# Patient Record
Sex: FEMALE | Race: WHITE | Employment: FULL TIME | URBAN - METROPOLITAN AREA
[De-identification: names, ages, dates, MRNs, and addresses within clinical notes are randomized per-mention and may not be internally consistent; named-entity substitution may affect disease eponyms.]

---

## 2017-03-09 ENCOUNTER — HOSPITAL ENCOUNTER (EMERGENCY)
Facility: HOSPITAL | Age: 22
Discharge: HOME | End: 2017-03-10
Attending: EMERGENCY MEDICINE
Payer: COMMERCIAL

## 2017-03-09 DIAGNOSIS — K52.9 GASTROENTERITIS: Primary | ICD-10-CM

## 2017-03-09 PROCEDURE — 99283 EMERGENCY DEPT VISIT LOW MDM: CPT

## 2017-03-09 RX ORDER — ONDANSETRON 8 MG/1
8 TABLET, ORALLY DISINTEGRATING ORAL ONCE
Status: COMPLETED | OUTPATIENT
Start: 2017-03-09 | End: 2017-03-10

## 2017-03-10 VITALS
TEMPERATURE: 98 F | WEIGHT: 198 LBS | OXYGEN SATURATION: 98 % | SYSTOLIC BLOOD PRESSURE: 109 MMHG | HEIGHT: 62 IN | RESPIRATION RATE: 18 BRPM | BODY MASS INDEX: 36.44 KG/M2 | DIASTOLIC BLOOD PRESSURE: 53 MMHG | HEART RATE: 81 BPM

## 2017-03-10 RX ORDER — ONDANSETRON 4 MG/1
TABLET, ORALLY DISINTEGRATING ORAL
Status: COMPLETED
Start: 2017-03-10 | End: 2017-03-10

## 2017-03-10 RX ORDER — ONDANSETRON 4 MG/1
4 TABLET, ORALLY DISINTEGRATING ORAL EVERY 6 HOURS PRN
Qty: 10 TABLET | Refills: 0 | Status: SHIPPED | OUTPATIENT
Start: 2017-03-10 | End: 2017-03-17

## 2017-03-10 NOTE — ED PROVIDER NOTES
Patient Seen in: [facility] Emergency Department    History   No chief complaint on file.     Stated Complaint: NVD, Abdominal pain    HPI    17-year-old female with sudden onset of vomiting and diarrhea that began about 1 hour after eating shrimp Triage Vitals   BP 03/09/17 2347 133/70 mmHg   Pulse 03/09/17 2347 74   Resp 03/09/17 2347 16   Temp 03/09/17 2347 98 °F (36.7 °C)   Temp src --    SpO2 03/09/17 2347 99 %   O2 Device --        Current:/70 mmHg  Pulse 74  Temp(Src) 98 °F (36.7 °C)  R List    START taking these medications    ondansetron 4 MG Oral Tablet Dispersible  Take 1 tablet (4 mg total) by mouth every 6 (six) hours as needed for Nausea.   Qty: 10 tablet Refills: 0

## 2017-03-10 NOTE — ED NOTES
Pt denies being nauseous, dcd to home with family, discharge instruction given and voices understanding, prescription given, denies any concern

## 2017-06-14 PROBLEM — L98.9 SKIN LESION OF LEFT LEG: Status: ACTIVE | Noted: 2017-06-14

## 2017-06-15 PROBLEM — D22.72: Status: ACTIVE | Noted: 2017-06-15

## 2017-06-15 PROBLEM — D22.9 NEVUS: Status: ACTIVE | Noted: 2017-06-15

## 2017-06-22 PROBLEM — B08.1 MOLLUSCUM CONTAGIOSUM: Status: ACTIVE | Noted: 2017-06-22

## 2017-11-29 ENCOUNTER — APPOINTMENT (OUTPATIENT)
Dept: OCCUPATIONAL MEDICINE | Age: 22
End: 2017-11-29
Attending: FAMILY MEDICINE

## 2018-10-17 ENCOUNTER — IMAGING SERVICES (OUTPATIENT)
Dept: OTHER | Age: 23
End: 2018-10-17

## 2018-10-17 ENCOUNTER — DIAGNOSTIC TRANS (OUTPATIENT)
Dept: OTHER | Age: 23
End: 2018-10-17

## 2018-10-17 ENCOUNTER — HOSPITAL (OUTPATIENT)
Dept: OTHER | Age: 23
End: 2018-10-17
Attending: EMERGENCY MEDICINE

## 2023-08-09 ENCOUNTER — LAB SERVICES (OUTPATIENT)
Dept: LAB | Age: 28
End: 2023-08-09

## 2023-08-09 ENCOUNTER — LAB REQUISITION (OUTPATIENT)
Dept: LAB | Age: 28
End: 2023-08-09

## 2023-08-09 DIAGNOSIS — Z13.228 ENCOUNTER FOR SCREENING FOR OTHER METABOLIC DISORDERS: ICD-10-CM

## 2023-08-09 DIAGNOSIS — E56.9 VITAMIN DEFICIENCY, UNSPECIFIED: ICD-10-CM

## 2023-08-09 DIAGNOSIS — Z12.4 ENCOUNTER FOR SCREENING FOR MALIGNANT NEOPLASM OF CERVIX: ICD-10-CM

## 2023-08-09 DIAGNOSIS — Z13.0 ENCOUNTER FOR SCREENING FOR DISEASES OF THE BLOOD AND BLOOD-FORMING ORGANS AND CERTAIN DISORDERS INVOLVING THE IMMUNE MECHANISM: ICD-10-CM

## 2023-08-09 DIAGNOSIS — Z20.2 CONTACT WITH AND (SUSPECTED) EXPOSURE TO INFECTIONS WITH A PREDOMINANTLY SEXUAL MODE OF TRANSMISSION: ICD-10-CM

## 2023-08-09 DIAGNOSIS — Z13.21 ENCOUNTER FOR SCREENING FOR NUTRITIONAL DISORDER: ICD-10-CM

## 2023-08-09 DIAGNOSIS — Z13.29 ENCOUNTER FOR SCREENING FOR OTHER SUSPECTED ENDOCRINE DISORDER: ICD-10-CM

## 2023-08-09 PROCEDURE — 87591 N.GONORRHOEAE DNA AMP PROB: CPT | Performed by: CLINICAL MEDICAL LABORATORY

## 2023-08-09 PROCEDURE — 36415 COLL VENOUS BLD VENIPUNCTURE: CPT | Performed by: CLINICAL MEDICAL LABORATORY

## 2023-08-09 PROCEDURE — 87661 TRICHOMONAS VAGINALIS AMPLIF: CPT | Performed by: CLINICAL MEDICAL LABORATORY

## 2023-08-09 PROCEDURE — 82746 ASSAY OF FOLIC ACID SERUM: CPT | Performed by: CLINICAL MEDICAL LABORATORY

## 2023-08-09 PROCEDURE — 82652 VIT D 1 25-DIHYDROXY: CPT | Performed by: CLINICAL MEDICAL LABORATORY

## 2023-08-09 PROCEDURE — 80061 LIPID PANEL: CPT | Performed by: CLINICAL MEDICAL LABORATORY

## 2023-08-09 PROCEDURE — 87481 CANDIDA DNA AMP PROBE: CPT | Performed by: CLINICAL MEDICAL LABORATORY

## 2023-08-09 PROCEDURE — 88175 CYTOPATH C/V AUTO FLUID REDO: CPT | Performed by: CLINICAL MEDICAL LABORATORY

## 2023-08-09 PROCEDURE — 82607 VITAMIN B-12: CPT | Performed by: CLINICAL MEDICAL LABORATORY

## 2023-08-09 PROCEDURE — 81513 NFCT DS BV RNA VAG FLU ALG: CPT | Performed by: CLINICAL MEDICAL LABORATORY

## 2023-08-09 PROCEDURE — 87563 M. GENITALIUM AMP PROBE: CPT | Performed by: CLINICAL MEDICAL LABORATORY

## 2023-08-09 PROCEDURE — 87491 CHLMYD TRACH DNA AMP PROBE: CPT | Performed by: CLINICAL MEDICAL LABORATORY

## 2023-08-09 PROCEDURE — 80050 GENERAL HEALTH PANEL: CPT | Performed by: CLINICAL MEDICAL LABORATORY

## 2023-08-09 PROCEDURE — 87624 HPV HI-RISK TYP POOLED RSLT: CPT | Performed by: CLINICAL MEDICAL LABORATORY

## 2023-08-09 PROCEDURE — 83036 HEMOGLOBIN GLYCOSYLATED A1C: CPT | Performed by: CLINICAL MEDICAL LABORATORY

## 2023-08-10 LAB
ALBUMIN SERPL-MCNC: 3.9 G/DL (ref 3.6–5.1)
ALBUMIN/GLOB SERPL: 1.1 {RATIO} (ref 1–2.4)
ALP SERPL-CCNC: 66 UNITS/L (ref 45–117)
ALT SERPL-CCNC: 36 UNITS/L
ANION GAP SERPL CALC-SCNC: 11 MMOL/L (ref 7–19)
AST SERPL-CCNC: 19 UNITS/L
BASOPHILS # BLD: 0.1 K/MCL (ref 0–0.3)
BASOPHILS NFR BLD: 1 %
BILIRUB SERPL-MCNC: 0.4 MG/DL (ref 0.2–1)
BUN SERPL-MCNC: 14 MG/DL (ref 6–20)
BUN/CREAT SERPL: 17 (ref 7–25)
C TRACH RRNA SPEC QL NAA+PROBE: NEGATIVE
CALCIUM SERPL-MCNC: 9.3 MG/DL (ref 8.4–10.2)
CHLORIDE SERPL-SCNC: 108 MMOL/L (ref 97–110)
CHOLEST SERPL-MCNC: 177 MG/DL
CHOLEST/HDLC SERPL: 4.4 {RATIO}
CO2 SERPL-SCNC: 27 MMOL/L (ref 21–32)
CREAT SERPL-MCNC: 0.84 MG/DL (ref 0.51–0.95)
DEPRECATED RDW RBC: 43 FL (ref 39–50)
EOSINOPHIL # BLD: 0.1 K/MCL (ref 0–0.5)
EOSINOPHIL NFR BLD: 2 %
ERYTHROCYTE [DISTWIDTH] IN BLOOD: 13.2 % (ref 11–15)
FASTING DURATION TIME PATIENT: 8 HOURS (ref 0–999)
FOLATE SERPL-MCNC: 12.1 NG/ML
GFR SERPLBLD BASED ON 1.73 SQ M-ARVRAT: >90 ML/MIN
GLOBULIN SER-MCNC: 3.5 G/DL (ref 2–4)
GLUCOSE SERPL-MCNC: 65 MG/DL (ref 70–99)
HBA1C MFR BLD: 4.7 % (ref 4.5–5.6)
HCT VFR BLD CALC: 40.8 % (ref 36–46.5)
HDLC SERPL-MCNC: 40 MG/DL
HGB BLD-MCNC: 13.7 G/DL (ref 12–15.5)
IMM GRANULOCYTES # BLD AUTO: 0 K/MCL (ref 0–0.2)
IMM GRANULOCYTES # BLD: 0 %
LDLC SERPL CALC-MCNC: 117 MG/DL
LYMPHOCYTES # BLD: 2 K/MCL (ref 1–4.8)
LYMPHOCYTES NFR BLD: 33 %
Lab: NORMAL
MCH RBC QN AUTO: 29.9 PG (ref 26–34)
MCHC RBC AUTO-ENTMCNC: 33.6 G/DL (ref 32–36.5)
MCV RBC AUTO: 89.1 FL (ref 78–100)
MONOCYTES # BLD: 0.3 K/MCL (ref 0.3–0.9)
MONOCYTES NFR BLD: 5 %
N GONORRHOEA RRNA SPEC QL NAA+PROBE: NEGATIVE
NEUTROPHILS # BLD: 3.6 K/MCL (ref 1.8–7.7)
NEUTROPHILS NFR BLD: 59 %
NONHDLC SERPL-MCNC: 137 MG/DL
NRBC BLD MANUAL-RTO: 0 /100 WBC
PLATELET # BLD AUTO: 318 K/MCL (ref 140–450)
POTASSIUM SERPL-SCNC: 4.8 MMOL/L (ref 3.4–5.1)
PROT SERPL-MCNC: 7.4 G/DL (ref 6.4–8.2)
RBC # BLD: 4.58 MIL/MCL (ref 4–5.2)
SODIUM SERPL-SCNC: 141 MMOL/L (ref 135–145)
T VAGINALIS RRNA SPEC QL NAA+PROBE: NEGATIVE
TRIGL SERPL-MCNC: 99 MG/DL
TSH SERPL-ACNC: 0.4 MCUNITS/ML (ref 0.35–5)
VIT B12 SERPL-MCNC: 383 PG/ML (ref 211–911)
WBC # BLD: 6.1 K/MCL (ref 4.2–11)

## 2023-08-11 LAB
BACTERIAL VAGINOSIS VAG-IMP: POSITIVE
C ALBICANS DNA VAG QL NAA+PROBE: NOT DETECTED
C GLABRATA DNA VAG QL NAA+PROBE: NOT DETECTED
M GENITALIUM DNA SPEC QL NAA+PROBE: POSITIVE
SERVICE CMNT-IMP: ABNORMAL
T VAGINALIS DNA VAG QL NAA+PROBE: NOT DETECTED
VITAMIN D2 SERPL-MCNC: 69.4 PG/ML (ref 19.9–79.3)

## 2023-08-17 LAB
CASE RPRT: NORMAL
CLINICAL INFO: NORMAL
CYTOLOGY CVX/VAG STUDY: NORMAL
CYTOLOGY CVX/VAG STUDY: NORMAL
HPV16+18+45 E6+E7MRNA CVX NAA+PROBE: NEGATIVE
Lab: NORMAL
PAP EDUCATIONAL NOTE: NORMAL
SPECIMEN ADEQUACY: NORMAL

## 2023-08-31 ENCOUNTER — LAB REQUISITION (OUTPATIENT)
Dept: LAB | Age: 28
End: 2023-08-31

## 2023-08-31 DIAGNOSIS — A49.3 MYCOPLASMA INFECTION, UNSPECIFIED SITE: ICD-10-CM

## 2023-08-31 DIAGNOSIS — N76.0 ACUTE VAGINITIS: ICD-10-CM

## 2023-08-31 PROCEDURE — 87563 M. GENITALIUM AMP PROBE: CPT | Performed by: CLINICAL MEDICAL LABORATORY

## 2023-08-31 PROCEDURE — 81513 NFCT DS BV RNA VAG FLU ALG: CPT | Performed by: CLINICAL MEDICAL LABORATORY

## 2023-08-31 PROCEDURE — 87481 CANDIDA DNA AMP PROBE: CPT | Performed by: CLINICAL MEDICAL LABORATORY

## 2023-08-31 PROCEDURE — 87661 TRICHOMONAS VAGINALIS AMPLIF: CPT | Performed by: CLINICAL MEDICAL LABORATORY

## 2023-09-01 LAB
BACTERIAL VAGINOSIS VAG-IMP: NOT DETECTED
C ALBICANS DNA VAG QL NAA+PROBE: NOT DETECTED
C GLABRATA DNA VAG QL NAA+PROBE: NOT DETECTED
M GENITALIUM DNA SPEC QL NAA+PROBE: NOT DETECTED
SERVICE CMNT-IMP: NORMAL
SERVICE CMNT-IMP: NORMAL
T VAGINALIS DNA VAG QL NAA+PROBE: NOT DETECTED

## 2023-10-10 PROBLEM — L98.9 SKIN LESION OF LEFT LEG: Status: ACTIVE | Noted: 2017-06-14

## 2023-10-10 PROBLEM — B08.1 MOLLUSCUM CONTAGIOSUM: Status: ACTIVE | Noted: 2017-06-22

## 2023-10-10 PROBLEM — D22.9 NEVUS: Status: ACTIVE | Noted: 2017-06-15

## 2023-10-10 PROBLEM — D22.72: Status: ACTIVE | Noted: 2017-06-15

## 2023-10-10 PROBLEM — E28.2 POLYCYSTIC OVARIES: Status: ACTIVE | Noted: 2023-10-10

## 2023-10-12 PROBLEM — N76.0 BACTERIAL VAGINOSIS: Status: ACTIVE | Noted: 2023-10-12

## 2023-10-12 PROBLEM — B96.89 BACTERIAL VAGINOSIS: Status: ACTIVE | Noted: 2023-10-12

## 2023-12-11 ENCOUNTER — APPOINTMENT (OUTPATIENT)
Dept: CT IMAGING | Age: 28
End: 2023-12-11

## 2023-12-11 ENCOUNTER — APPOINTMENT (OUTPATIENT)
Dept: GENERAL RADIOLOGY | Age: 28
End: 2023-12-11

## 2023-12-11 ENCOUNTER — HOSPITAL ENCOUNTER (EMERGENCY)
Age: 28
Discharge: HOME | End: 2023-12-11

## 2023-12-11 VITALS
HEART RATE: 68 BPM | OXYGEN SATURATION: 98 % | SYSTOLIC BLOOD PRESSURE: 120 MMHG | TEMPERATURE: 98.2 F | DIASTOLIC BLOOD PRESSURE: 71 MMHG | RESPIRATION RATE: 16 BRPM

## 2023-12-11 DIAGNOSIS — M54.50 BACK PAIN OF THORACOLUMBAR REGION: ICD-10-CM
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
## 2023-12-11 DIAGNOSIS — S00.83XA CONTUSION OF FACE, INITIAL ENCOUNTER: Primary | ICD-10-CM

## 2023-12-11 DIAGNOSIS — M54.6 BACK PAIN OF THORACOLUMBAR REGION: ICD-10-CM

## 2023-12-11 DIAGNOSIS — M54.2 NECK PAIN: ICD-10-CM

## 2023-12-11 LAB
APPEARANCE UR: CLEAR
BILIRUB UR QL STRIP: NEGATIVE
COLOR UR: YELLOW
GLUCOSE UR STRIP-MCNC: NEGATIVE MG/DL
HCG UR QL: NEGATIVE
HGB UR QL STRIP: ABNORMAL
KETONES UR STRIP-MCNC: NEGATIVE MG/DL
LEUKOCYTE ESTERASE UR QL STRIP: ABNORMAL
NITRITE UR QL STRIP: NEGATIVE
PH UR STRIP: 6 UNITS (ref 5–7)
PROT UR STRIP-MCNC: 30 MG/DL
SP GR UR STRIP: 1.02 (ref 1–1.03)
UROBILINOGEN UR STRIP-MCNC: 0.2 MG/DL

## 2023-12-11 PROCEDURE — 72110 X-RAY EXAM L-2 SPINE 4/>VWS: CPT

## 2023-12-11 PROCEDURE — 10002800 HB RX 250 W HCPCS: Performed by: PHYSICIAN ASSISTANT

## 2023-12-11 PROCEDURE — 99284 EMERGENCY DEPT VISIT MOD MDM: CPT | Performed by: PHYSICIAN ASSISTANT

## 2023-12-11 PROCEDURE — 72125 CT NECK SPINE W/O DYE: CPT

## 2023-12-11 PROCEDURE — 84703 CHORIONIC GONADOTROPIN ASSAY: CPT

## 2023-12-11 PROCEDURE — 90471 IMMUNIZATION ADMIN: CPT | Performed by: PHYSICIAN ASSISTANT

## 2023-12-11 PROCEDURE — 10004651 HB RX, NO CHARGE ITEM: Performed by: PHYSICIAN ASSISTANT

## 2023-12-11 PROCEDURE — 90715 TDAP VACCINE 7 YRS/> IM: CPT | Performed by: PHYSICIAN ASSISTANT

## 2023-12-11 PROCEDURE — 81003 URINALYSIS AUTO W/O SCOPE: CPT

## 2023-12-11 PROCEDURE — 70486 CT MAXILLOFACIAL W/O DYE: CPT

## 2023-12-11 RX ORDER — LIDOCAINE 50 MG/G
1 PATCH TOPICAL EVERY 12 HOURS
Qty: 10 PATCH | Refills: 0 | Status: SHIPPED | OUTPATIENT
Start: 2023-12-11

## 2023-12-11 RX ORDER — NAPROXEN 500 MG/1
500 TABLET ORAL 2 TIMES DAILY WITH MEALS
Qty: 10 TABLET | Refills: 0 | Status: SHIPPED | OUTPATIENT
Start: 2023-12-11

## 2023-12-11 RX ORDER — GINSENG 100 MG
CAPSULE ORAL 2 TIMES DAILY
Qty: 15 G | Refills: 1 | Status: SHIPPED | OUTPATIENT
Start: 2023-12-11

## 2023-12-11 RX ORDER — ACETAMINOPHEN 325 MG/1
650 TABLET ORAL ONCE
Status: COMPLETED | OUTPATIENT
Start: 2023-12-11 | End: 2023-12-11

## 2023-12-11 RX ADMIN — ACETAMINOPHEN 650 MG: 325 TABLET ORAL at 14:00

## 2023-12-11 RX ADMIN — TETANUS TOXOID, REDUCED DIPHTHERIA TOXOID AND ACELLULAR PERTUSSIS VACCINE, ADSORBED 0.5 ML: 5; 2.5; 8; 8; 2.5 SUSPENSION INTRAMUSCULAR at 13:59

## 2023-12-11 ASSESSMENT — ENCOUNTER SYMPTOMS
PSYCHIATRIC NEGATIVE: 1
RESPIRATORY NEGATIVE: 1
CONSTITUTIONAL NEGATIVE: 1
ENDOCRINE NEGATIVE: 1
ALLERGIC/IMMUNOLOGIC NEGATIVE: 1
EYES NEGATIVE: 1
WOUND: 1
GASTROINTESTINAL NEGATIVE: 1
NEUROLOGICAL NEGATIVE: 1
BACK PAIN: 1
HEMATOLOGIC/LYMPHATIC NEGATIVE: 1

## 2024-06-18 ENCOUNTER — OFFICE VISIT (OUTPATIENT)
Dept: SURGERY | Facility: CLINIC | Age: 29
End: 2024-06-18

## 2024-06-18 VITALS
DIASTOLIC BLOOD PRESSURE: 62 MMHG | WEIGHT: 163.38 LBS | BODY MASS INDEX: 28.95 KG/M2 | SYSTOLIC BLOOD PRESSURE: 108 MMHG | HEIGHT: 63 IN

## 2024-06-18 DIAGNOSIS — R87.612 LGSIL ON PAP SMEAR OF CERVIX: Primary | ICD-10-CM

## 2024-06-18 PROCEDURE — 3008F BODY MASS INDEX DOCD: CPT | Performed by: OBSTETRICS & GYNECOLOGY

## 2024-06-18 PROCEDURE — G2211 COMPLEX E/M VISIT ADD ON: HCPCS | Performed by: OBSTETRICS & GYNECOLOGY

## 2024-06-18 PROCEDURE — 3074F SYST BP LT 130 MM HG: CPT | Performed by: OBSTETRICS & GYNECOLOGY

## 2024-06-18 PROCEDURE — 3078F DIAST BP <80 MM HG: CPT | Performed by: OBSTETRICS & GYNECOLOGY

## 2024-06-18 PROCEDURE — 99203 OFFICE O/P NEW LOW 30 MIN: CPT | Performed by: OBSTETRICS & GYNECOLOGY

## 2024-06-18 RX ORDER — NORGESTREL-ETHINYL ESTRADIOL 0.3-0.03MG
1 TABLET ORAL DAILY
COMMUNITY
Start: 2023-07-26

## 2024-06-18 NOTE — PROGRESS NOTES
NEW GYN H&P     2024  2:29 PM    Chief Complaint   Patient presents with    New Patient     New pt referred by Dr. 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    HPI: Patient is a 28 year old  LMP 24 referred by PCP here to establish care for management of abnormal LGSIL PAP on 2024. Outside records and results reviewed. Reports history of irregular cycles and PCOS managed with OCPs. No prior abnormal PAPs. Discussed need for colposcopy as next step in evaluation - will schedule procedure. Questions answered and agrees with plan.    Patient's last menstrual period was 2024 (exact date).    OB History    Para Term  AB Living   0 0 0 0 0 0   SAB IAB Ectopic Multiple Live Births   0 0 0 0         GYN hx:    Hx Prior Abnormal Pap: Yes (LGSIL)  Pap Date: 24  Pap Result Notes: per pt last pap done 2024=+ hpv  CONTRACEPTION: OCP  LAST MAMMOGRAM: N/A      Current Outpatient Medications   Medication Sig Dispense Refill    CRYSELLE-28 0.3-30 MG-MCG Oral Tab Take 1 tablet by mouth daily.      albuterol sulfate (2.5 MG/3ML) 0.083% Inhalation Nebu Soln Take by nebulization every 6 (six) hours as needed for Wheezing. (Patient not taking: Reported on 2024)      MetFORMIN HCl 500 MG Oral Tab Take 500 mg by mouth 2 (two) times daily with meals. (Patient not taking: Reported on 2024)      MedroxyPROGESTERone Acetate 10 MG Oral Tab Take 1 tablet (10 mg total) by mouth daily. (Patient not taking: Reported on 2024) 10 tablet 0       Past Medical History:    Diabetes (HCC)    PCOS (polycystic ovarian syndrome)    Sexually transmitted disease    HPV     Past Surgical History:   Procedure Laterality Date    Patient denies any surgical history      24     No Known Allergies  Family History   Problem Relation Age of Onset    Diabetes Mother     Ovarian Cancer Neg     Uterine Cancer Neg     Pancreatic Cancer Neg     Colon Cancer Neg      Social History      Socioeconomic History    Marital status: Single   Tobacco Use    Smoking status: Never    Smokeless tobacco: Never   Substance and Sexual Activity    Alcohol use: Not Currently     Alcohol/week: 0.0 - 2.0 standard drinks of alcohol    Drug use: No    Sexual activity: Yes     Partners: Male     Social History     Social History Narrative    Not on file       ROS:     Review of Systems:  A comprehensive 10 point ROS was completed. All pertinent positives and negatives noted in the HPI.     /62   Ht 63\"   Wt 163 lb 6.4 oz (74.1 kg)   LMP 06/07/2024 (Exact Date)   BMI 28.95 kg/m²         A/P: Patient is 28 year old female     1. LGSIL on Pap smear of cervix  - Colposcopy to be scheduled      Total time spent = 30 minutes  >50% visit = face to face counseling and coordination of care        6/18/2024  Peg Tran MD

## 2024-07-23 ENCOUNTER — OFFICE VISIT (OUTPATIENT)
Dept: OBGYN CLINIC | Facility: CLINIC | Age: 29
End: 2024-07-23
Payer: COMMERCIAL

## 2024-07-23 DIAGNOSIS — R87.612 LGSIL ON PAP SMEAR OF CERVIX: Primary | ICD-10-CM

## 2024-07-23 DIAGNOSIS — M62.89 PELVIC FLOOR TENSION: ICD-10-CM

## 2024-07-23 LAB
CONTROL LINE PRESENT WITH A CLEAR BACKGROUND (YES/NO): YES YES/NO
KIT LOT #: NORMAL NUMERIC
PREGNANCY TEST, URINE: NEGATIVE

## 2024-07-23 PROCEDURE — 81025 URINE PREGNANCY TEST: CPT | Performed by: OBSTETRICS & GYNECOLOGY

## 2024-07-23 PROCEDURE — 57454 BX/CURETT OF CERVIX W/SCOPE: CPT | Performed by: OBSTETRICS & GYNECOLOGY

## 2024-07-23 PROCEDURE — 88305 TISSUE EXAM BY PATHOLOGIST: CPT | Performed by: OBSTETRICS & GYNECOLOGY

## 2024-07-23 NOTE — PROCEDURES
[facility]  Obstetrics and Gynecology  Colposcopy Procedure Note  Peg Tran MD    Colpo w/Cx Biopsy and ECC    Indication: 5/6/2024 PAP = LGSIL  Pregnancy Results: negative from urine test     Procedure discussed with patient in detail including indication, risk, benefits, alternatives and complications.  Consent signed.    Pre-Procedure:  Cervix prepped with:  Acetic acid    Procedure:  Under satisfactory analgesia, the patient was put in the dorsal lithotomy position.  Thayer speculum was placed in the vagina.  Marked pelvic floor tension noted.  Under colposcopic examination the transition zone was poorly visualized.   ECC done then cervical biopsy taken at 6 o'clock where increased acetowhite changes noted.  Patient tolerated procedure well.      Findings:  Acetowhite epithelium and pelvic floor tension    Plan:  Biopsies to Pathology  Consider PFPT if insufficient biopsy      Peg Tran MD  9:12 AM  7/23/2024

## 2024-09-17 ENCOUNTER — TELEPHONE (OUTPATIENT)
Dept: OBGYN CLINIC | Facility: CLINIC | Age: 29
End: 2024-09-17

## 2024-09-17 DIAGNOSIS — N93.9 ABNORMAL UTERINE BLEEDING (AUB): Primary | ICD-10-CM

## 2024-09-17 NOTE — TELEPHONE ENCOUNTER
RN spoke with pt. Pt has had AUB all summer. Pt has been getting her period twice monthly for past 3 months. Currently, pt has been bleeding since 9/4. Pt reports saturating tampon q3h and says her flow varies. Pt denies saturating a pad/tampon hourly or feeling dizzy or lightheaded. Pt says her acne has also worsened. Pt is currently taking Cryselle and has taken triphasic OCPs in the past, but didn't like the triphasic pills d/t weight gain. Pt would like to switch OCPs. RN told pt EB is out of the office until Friday, but RN will speak to EB then and will call pt back. Pt verbalized understanding and agreed with plan of care.

## 2024-09-17 NOTE — TELEPHONE ENCOUNTER
Incoming call from patient stating that she has had her period for 3 weeks. Patient would like to discuss changing her birth control. Patient prefers a tele-health appointment if an appointment is needed.     Please assist.

## 2024-12-12 ENCOUNTER — TELEPHONE (OUTPATIENT)
Dept: OBGYN CLINIC | Facility: CLINIC | Age: 29
End: 2024-12-12

## 2024-12-12 NOTE — TELEPHONE ENCOUNTER
Per in office tickler file, pt due for 6 month repeat pap. [employer] message sent to pt.   Pap: 05/06/2024=LSIL  Colpo: 07/23/2024

## (undated) NOTE — LETTER
Mariya Humphrey, :1995    CONSENT FOR PROCEDURE/SEDATION    1. I authorize the performance upon Mariya Humphrey  the following: Colposcopy with biopsy and Endocervical curettage    2. I authorize Dr. Peg Tran MD (and whomever is designated as the doctor’s assistant), to perform the above-mentioned procedures.    3. If any unforeseen conditions arise during this procedure calling for additional  procedures, operations, or medications (including anesthesia and blood transfusion), I further request and authorize the doctor to do whatever he/she deems advisable in my interest.    4. I consent to the taking and reproduction of any photographs in the course of this procedure for professional purposes.    5. I consent to the administration of such sedation as may be considered necessary or advisable by the physician responsible for this service, with the exception of ______________________________________________________    6. I have been informed by my doctor of the nature and purpose of this procedure sedation, possible alternative methods of treatment, risk involved and possible complications.      Signature of Patient:_______________________________________________    Signature of person authorized to consent for patient:  _______________________________________________________________    Relationship to patient: ____________________________________________    Witness: _________________________________________ Date:___________     Physician Signature: _______________________________ Date:___________

## (undated) NOTE — ED AVS SNAPSHOT
[facility] Emergency Department    J[address]ya [address]     [place] [address]    Phone:  [phone]    Fax:  [phone]   MRN: [id number]    Department:  [facility] Emergency Department   Date of Visit:  3/9/ Si tiene problemas para programar dre carly de seguimiento según lo indicado, llame al encargado de d[place] al [phone]. It is our goal to assure that you are completely satisfied with every aspect of your visit today.   In an effort to constantly impr list to your next doctor's appointment. Any imaging studies and labs completed today can be reviewed in your MyChart account. You may have had testing done that requires us to contact you. Please make sure we have your correct phone number on file. Sign up for [employer], your secure online medical record. [employer] will allow you to access patient instructions from your recent visit,  view other health information, and more. To sign up or find more information, go to [email or web address]

## (undated) NOTE — ED AVS SNAPSHOT
[facility] Emergency Department    [address]     [place] [address]    Phone:  [phone]    Fax:  [address]   MRN: [id number]    Department:  [facility] Emergency Department   Date of Visit:  3/9/ and Class Registration line at [phone] or find a doctor online by visiting [email or web address].    IF THERE IS ANY CHANGE OR WORSENING OF YOUR CONDITION, CALL YOUR PRIMARY CARE PHYSICIAN AT ONCE OR RETURN IMMEDIATELY TO [address] [place] [address].     If

## (undated) NOTE — LETTER
March 10, 2017    Patient: Stefany Rosario   Date of Visit: 3/9/2017       To Whom It May Concern:    Stefany Rosario was seen and treated in our emergency department on 3/9/2017. She should not return to work until 3/11/17.     If you have any question